# Patient Record
Sex: MALE | Race: WHITE | NOT HISPANIC OR LATINO | Employment: UNEMPLOYED | URBAN - METROPOLITAN AREA
[De-identification: names, ages, dates, MRNs, and addresses within clinical notes are randomized per-mention and may not be internally consistent; named-entity substitution may affect disease eponyms.]

---

## 2020-07-02 ENCOUNTER — HOSPITAL ENCOUNTER (EMERGENCY)
Facility: HOSPITAL | Age: 65
Discharge: HOME | End: 2020-07-03
Attending: EMERGENCY MEDICINE

## 2020-07-02 ENCOUNTER — NURSE TRIAGE (OUTPATIENT)
Dept: ADMINISTRATIVE | Facility: CLINIC | Age: 65
End: 2020-07-02

## 2020-07-02 DIAGNOSIS — N20.0 KIDNEY STONE: Primary | ICD-10-CM

## 2020-07-02 PROCEDURE — 99284 EMERGENCY DEPT VISIT MOD MDM: CPT | Mod: ,,, | Performed by: EMERGENCY MEDICINE

## 2020-07-02 PROCEDURE — 80047 BASIC METABLC PNL IONIZED CA: CPT

## 2020-07-02 PROCEDURE — 99284 EMERGENCY DEPT VISIT MOD MDM: CPT | Mod: 25

## 2020-07-02 PROCEDURE — 99284 PR EMERGENCY DEPT VISIT,LEVEL IV: ICD-10-PCS | Mod: ,,, | Performed by: EMERGENCY MEDICINE

## 2020-07-03 VITALS
DIASTOLIC BLOOD PRESSURE: 77 MMHG | TEMPERATURE: 98 F | HEART RATE: 74 BPM | SYSTOLIC BLOOD PRESSURE: 153 MMHG | OXYGEN SATURATION: 97 % | RESPIRATION RATE: 14 BRPM

## 2020-07-03 LAB
ALBUMIN SERPL BCP-MCNC: 4.1 G/DL (ref 3.5–5.2)
ALP SERPL-CCNC: 69 U/L (ref 55–135)
ALT SERPL W/O P-5'-P-CCNC: 14 U/L (ref 10–44)
ANION GAP SERPL CALC-SCNC: 10 MMOL/L (ref 8–16)
AST SERPL-CCNC: 19 U/L (ref 10–40)
BACTERIA #/AREA URNS AUTO: ABNORMAL /HPF
BASOPHILS # BLD AUTO: 0.03 K/UL (ref 0–0.2)
BASOPHILS NFR BLD: 0.2 % (ref 0–1.9)
BILIRUB SERPL-MCNC: 1 MG/DL (ref 0.1–1)
BILIRUB UR QL STRIP: NEGATIVE
BUN SERPL-MCNC: 16 MG/DL (ref 8–23)
BUN SERPL-MCNC: 18 MG/DL (ref 6–30)
CALCIUM SERPL-MCNC: 9.1 MG/DL (ref 8.7–10.5)
CHLORIDE SERPL-SCNC: 109 MMOL/L (ref 95–110)
CHLORIDE SERPL-SCNC: 110 MMOL/L (ref 95–110)
CLARITY UR REFRACT.AUTO: ABNORMAL
CO2 SERPL-SCNC: 24 MMOL/L (ref 23–29)
COLOR UR AUTO: YELLOW
CREAT SERPL-MCNC: 1.2 MG/DL (ref 0.5–1.4)
CREAT SERPL-MCNC: 1.2 MG/DL (ref 0.5–1.4)
DIFFERENTIAL METHOD: ABNORMAL
EOSINOPHIL # BLD AUTO: 0 K/UL (ref 0–0.5)
EOSINOPHIL NFR BLD: 0.1 % (ref 0–8)
ERYTHROCYTE [DISTWIDTH] IN BLOOD BY AUTOMATED COUNT: 13.3 % (ref 11.5–14.5)
EST. GFR  (AFRICAN AMERICAN): >60 ML/MIN/1.73 M^2
EST. GFR  (NON AFRICAN AMERICAN): >60 ML/MIN/1.73 M^2
GLUCOSE SERPL-MCNC: 115 MG/DL (ref 70–110)
GLUCOSE SERPL-MCNC: 116 MG/DL (ref 70–110)
GLUCOSE UR QL STRIP: NEGATIVE
HCT VFR BLD AUTO: 43.2 % (ref 40–54)
HCT VFR BLD CALC: 42 %PCV (ref 36–54)
HGB BLD-MCNC: 14.5 G/DL (ref 14–18)
HGB UR QL STRIP: ABNORMAL
HYALINE CASTS UR QL AUTO: 4 /LPF
IMM GRANULOCYTES # BLD AUTO: 0.04 K/UL (ref 0–0.04)
IMM GRANULOCYTES NFR BLD AUTO: 0.3 % (ref 0–0.5)
KETONES UR QL STRIP: ABNORMAL
LEUKOCYTE ESTERASE UR QL STRIP: NEGATIVE
LIPASE SERPL-CCNC: 7 U/L (ref 4–60)
LYMPHOCYTES # BLD AUTO: 1 K/UL (ref 1–4.8)
LYMPHOCYTES NFR BLD: 7.6 % (ref 18–48)
MCH RBC QN AUTO: 29.7 PG (ref 27–31)
MCHC RBC AUTO-ENTMCNC: 33.6 G/DL (ref 32–36)
MCV RBC AUTO: 88 FL (ref 82–98)
MICROSCOPIC COMMENT: ABNORMAL
MONOCYTES # BLD AUTO: 0.9 K/UL (ref 0.3–1)
MONOCYTES NFR BLD: 6.7 % (ref 4–15)
NEUTROPHILS # BLD AUTO: 11.4 K/UL (ref 1.8–7.7)
NEUTROPHILS NFR BLD: 85.1 % (ref 38–73)
NITRITE UR QL STRIP: NEGATIVE
NRBC BLD-RTO: 0 /100 WBC
PH UR STRIP: 7 [PH] (ref 5–8)
PLATELET # BLD AUTO: 229 K/UL (ref 150–350)
PMV BLD AUTO: 9.5 FL (ref 9.2–12.9)
POC IONIZED CALCIUM: 1.18 MMOL/L (ref 1.06–1.42)
POC TCO2 (MEASURED): 25 MMOL/L (ref 23–29)
POTASSIUM BLD-SCNC: 3.7 MMOL/L (ref 3.5–5.1)
POTASSIUM SERPL-SCNC: 3.7 MMOL/L (ref 3.5–5.1)
PROT SERPL-MCNC: 7.1 G/DL (ref 6–8.4)
PROT UR QL STRIP: ABNORMAL
RBC # BLD AUTO: 4.89 M/UL (ref 4.6–6.2)
RBC #/AREA URNS AUTO: >100 /HPF (ref 0–4)
SAMPLE: ABNORMAL
SODIUM BLD-SCNC: 144 MMOL/L (ref 136–145)
SODIUM SERPL-SCNC: 143 MMOL/L (ref 136–145)
SP GR UR STRIP: 1.02 (ref 1–1.03)
SQUAMOUS #/AREA URNS AUTO: 1 /HPF
URN SPEC COLLECT METH UR: ABNORMAL
WBC # BLD AUTO: 13.44 K/UL (ref 3.9–12.7)
WBC #/AREA URNS AUTO: 4 /HPF (ref 0–5)

## 2020-07-03 PROCEDURE — 83690 ASSAY OF LIPASE: CPT

## 2020-07-03 PROCEDURE — 85025 COMPLETE CBC W/AUTO DIFF WBC: CPT

## 2020-07-03 PROCEDURE — 25000003 PHARM REV CODE 250: Performed by: EMERGENCY MEDICINE

## 2020-07-03 PROCEDURE — 80053 COMPREHEN METABOLIC PANEL: CPT

## 2020-07-03 PROCEDURE — 96361 HYDRATE IV INFUSION ADD-ON: CPT

## 2020-07-03 PROCEDURE — 81001 URINALYSIS AUTO W/SCOPE: CPT

## 2020-07-03 PROCEDURE — 96374 THER/PROPH/DIAG INJ IV PUSH: CPT

## 2020-07-03 PROCEDURE — 96375 TX/PRO/DX INJ NEW DRUG ADDON: CPT

## 2020-07-03 PROCEDURE — 63600175 PHARM REV CODE 636 W HCPCS: Performed by: EMERGENCY MEDICINE

## 2020-07-03 RX ORDER — ONDANSETRON 2 MG/ML
4 INJECTION INTRAMUSCULAR; INTRAVENOUS
Status: COMPLETED | OUTPATIENT
Start: 2020-07-03 | End: 2020-07-03

## 2020-07-03 RX ORDER — KETOROLAC TROMETHAMINE 30 MG/ML
15 INJECTION, SOLUTION INTRAMUSCULAR; INTRAVENOUS
Status: COMPLETED | OUTPATIENT
Start: 2020-07-03 | End: 2020-07-03

## 2020-07-03 RX ORDER — MORPHINE SULFATE 4 MG/ML
8 INJECTION, SOLUTION INTRAMUSCULAR; INTRAVENOUS
Status: COMPLETED | OUTPATIENT
Start: 2020-07-03 | End: 2020-07-03

## 2020-07-03 RX ADMIN — ONDANSETRON 4 MG: 2 INJECTION INTRAMUSCULAR; INTRAVENOUS at 12:07

## 2020-07-03 RX ADMIN — SODIUM CHLORIDE 1000 ML: 0.9 INJECTION, SOLUTION INTRAVENOUS at 12:07

## 2020-07-03 RX ADMIN — MORPHINE SULFATE 8 MG: 4 INJECTION INTRAVENOUS at 12:07

## 2020-07-03 RX ADMIN — KETOROLAC TROMETHAMINE 15 MG: 30 INJECTION, SOLUTION INTRAMUSCULAR at 12:07

## 2020-07-03 NOTE — PROVIDER PROGRESS NOTES - EMERGENCY DEPT.
Signed out to me pending urinalysis and CT results.  UA shows elevated RBCs, no sign of infection.  CT shows 4 mm stone with mild hydronephrosis.  Vital stable.  Will discharge home.  Advised pt to follow up with PCP or return if concerning symptoms arise. Pt understands and agrees with plan. Will d/c home.

## 2020-07-03 NOTE — ED PROVIDER NOTES
Source of History:  Patient    Chief complaint:  Flank Pain (+hematuria)    HPI:  Nicolás Kaur is a 64 y.o. male with no chronic medical issues presenting to emergency department with complaint of flank and abdominal pain.  Patient states pain was sudden onset, sharp, and radiating from his right flank to his right lower abdomen and groin.  There is no testicular pain or swelling.  He noted dark urine but no gross hematuria.  He has not had dysuria.  No fevers or chills.  He had multiple episodes of vomiting.  On arrival, pain is 10/10.    He denies any trauma.  No previous history of kidney stones.  No family history of kidney stones.  He denies drug use.  He is not a smoker.    Last bowel movement was normal.  No diarrhea.  No blood in stool.    ROS: As per HPI and below:  Review of Systems   Constitutional: Negative for fever.   HENT: Negative for sore throat.    Eyes: Negative for double vision.   Respiratory: Negative for cough and shortness of breath.    Cardiovascular: Negative for chest pain.   Gastrointestinal: Negative for abdominal pain and vomiting.   Genitourinary: Negative for dysuria.   Musculoskeletal: Negative for falls.   Skin: Negative for rash.   Neurological: Negative for headaches.       Review of patient's allergies indicates:  No Known Allergies    No current facility-administered medications on file prior to encounter.      No current outpatient medications on file prior to encounter.       PMH:  As per HPI and below:  No past medical history on file.  No past surgical history on file.    Social History     Socioeconomic History    Marital status: Single     Spouse name: Not on file    Number of children: Not on file    Years of education: Not on file    Highest education level: Not on file   Occupational History    Not on file   Social Needs    Financial resource strain: Not on file    Food insecurity     Worry: Not on file     Inability: Not on file    Transportation  needs     Medical: Not on file     Non-medical: Not on file   Tobacco Use    Smoking status: Not on file   Substance and Sexual Activity    Alcohol use: Not on file    Drug use: Not on file    Sexual activity: Not on file   Lifestyle    Physical activity     Days per week: Not on file     Minutes per session: Not on file    Stress: Not on file   Relationships    Social connections     Talks on phone: Not on file     Gets together: Not on file     Attends Sikh service: Not on file     Active member of club or organization: Not on file     Attends meetings of clubs or organizations: Not on file     Relationship status: Not on file   Other Topics Concern    Not on file   Social History Narrative    Not on file       No family history on file.    Physical Exam:      Vitals:    07/03/20 0217   BP: (!) 153/77   Pulse: 74   Resp: 14   Temp: 98 °F (36.7 °C)     Gen:  Initially very uncomfortable, difficult to find position of comfort.  Later comfortable after receiving analgesics.  Mental Status:  Alert and oriented x 3.  Appropriate, conversant.  Skin: Warm, dry. No rashes seen.  Eyes: No conjunctival injection.  Pulm: No increased work of breathing.  No significant tachypnea.  No audible stridor or wheezing.  No conversational dyspnea.  Auscultation limited secondary to PPE.  CV: Regular rate. Regular rhythm. Normal peripheral perfusion. No lower extremity edema.  Abd: Soft.  Not distended.  Nontender.   No CVA tenderness.  :  Normal penis and testicles, no tenderness to palpation, no swelling  MSK: Good range of motion all joints.  No deformities.    Neuro: Awake. Speech normal. No focal neuro deficit observed.    Laboratory Studies:  Labs Reviewed   CBC W/ AUTO DIFFERENTIAL - Abnormal; Notable for the following components:       Result Value    WBC 13.44 (*)     Gran # (ANC) 11.4 (*)     Gran% 85.1 (*)     Lymph% 7.6 (*)     All other components within normal limits   COMPREHENSIVE METABOLIC PANEL -  Abnormal; Notable for the following components:    Glucose 115 (*)     All other components within normal limits   URINALYSIS, REFLEX TO URINE CULTURE - Abnormal; Notable for the following components:    Appearance, UA Cloudy (*)     Protein, UA 1+ (*)     Ketones, UA 1+ (*)     Occult Blood UA 3+ (*)     All other components within normal limits    Narrative:     Specimen Source->Urine   URINALYSIS MICROSCOPIC - Abnormal; Notable for the following components:    RBC, UA >100 (*)     Hyaline Casts, UA 4 (*)     All other components within normal limits    Narrative:     Specimen Source->Urine   ISTAT PROCEDURE - Abnormal; Notable for the following components:    POC Glucose 116 (*)     All other components within normal limits   LIPASE       Chart reviewed.     Imaging Results          CT Renal Stone Study ABD Pelvis WO (Final result)  Result time 07/03/20 01:52:50    Final result by Dilip Morris MD (07/03/20 01:52:50)                 Impression:      4 mm distal right ureteral stone with mild proximal right hydroureter and hydronephrosis.    4 mm nonobstructive renal stone in the left upper pole with no evidence of hydronephrosis on the left.    Exophytic cystic appearing structure originating from the left lower pole measuring 4.7 x 4.2 cm, likely representing mildly complex cyst.  Recommend non emergent ultrasound evaluation for further characterization if not previously performed.    Diverticulosis without evidence of diverticulitis.    Prostatomegaly.    Bilateral fat containing inguinal hernias versus spermatic cord lipomatosis.    Electronically signed by resident: Ron Patino  Date:    07/03/2020  Time:    00:51    Electronically signed by: Dilip Morris MD  Date:    07/03/2020  Time:    01:52             Narrative:    EXAMINATION:  CT RENAL STONE STUDY ABD PELVIS WO    CLINICAL HISTORY:  Flank pain, kidney stone suspected;    TECHNIQUE:  Low dose axial images, sagittal and coronal  reformations were obtained from the lung bases to the pubic symphysis.  Contrast was not administered.    COMPARISON:  None    FINDINGS:  Heart: Normal in size. No pericardial effusion.    Lung Bases: Well aerated, without consolidation or pleural fluid.    Liver: Normal in size and attenuation, with no focal hepatic lesions.    Gallbladder: No calcified gallstones.    Bile Ducts: No evidence of dilated ducts.    Pancreas: No mass or peripancreatic fat stranding.  Fatty replacement of the pancreas..    Spleen: Unremarkable.    Adrenals: Unremarkable.    Kidneys/ Ureters: Kidneys appear normal in size and location.  Four mm nonobstructive renal stone in the left upper pole.  4 mm right ureteral stone (axial series 2, image 85) with mild proximal dilation and mild right hydronephrosis.  Nonspecific perinephric fat stranding, right worse than left.  Exophytic cystic appearing structure originating from the left lower pole measuring 4.7 x 4.2 cm with attenuation slightly higher than expected for simple cyst.  Possibly representing mildly complex cyst..    Bladder: No evidence of wall thickening.    Reproductive organs: Prostatomegaly.  Bilateral fat containing inguinal hernias versus spermatic cord lipomatosis.    GI Tract/Mesentery: No evidence of bowel obstruction or inflammation.  Scattered colonic diverticuli without evidence of diverticulitis.    Peritoneal Space: No ascites. No free air.    Retroperitoneum: No significant adenopathy.    Abdominal wall: Unremarkable.    Vasculature: No significant atherosclerosis or aneurysm.    Bones: No acute fracture.  Degenerative changes of the spine and hips.                                Medications Given:  Medications   morphine injection 8 mg (8 mg Intravenous Given 7/3/20 0025)   ketorolac injection 15 mg (15 mg Intravenous Given 7/3/20 0024)   ondansetron injection 4 mg (4 mg Intravenous Given 7/3/20 0023)   sodium chloride 0.9% bolus 1,000 mL (0 mLs Intravenous Stopped  7/3/20 0137)     MDM:    64 y.o. male with acute onset severe right flank discomfort.  He is afebrile, stable, nontoxic.    Differential diagnosis including but not limited to:  Kidney stone, pyelonephritis, intra-abdominal infection, cystitis. Doubt testicular component as he has no testicular tenderness, testicular pain, or swelling.    Will plan CBC, CMP, lipase, urinalysis, and CT scan of abdomen.    Will give morphine, Toradol, fluids, Zofran, and re-evaluate.    Signed out to Dr. Monzon pending results of urinalysis and CT for final disposition.    Diagnostic Impression:    1. Kidney stone         ED Disposition Condition    Discharge Stable        ED Prescriptions     None        Follow-up Information     Follow up With Specialties Details Why Contact Info Additional Information    [facility] 4th Floor Urology   [address]  [facility] [id number] [phone] [facility] - 4th Floor                        Patient and/or family understands the plan and is in agreement, verbalized understanding, questions answered    Mary Kraus MD  Emergency Medicine       Mary Kraus MD  07/03/20 2570

## 2020-07-03 NOTE — ED NOTES
"Pt states, "6:30 took a shower and started having this pain right here and it went around to here. i'm a pretty healthy pedro. Don't drink or smoke or anything and I can handle pain but this hurts." pt reports right lower back pain that radiates around to RLQ of abdomen. No acute distress noted. Stable condition.   "

## 2020-07-03 NOTE — MEDICAL/APP STUDENT
"  History     Chief Complaint   Patient presents with    Flank Pain     +hematuria     64yoM presents with flank pain. Reports acute-onset R-sided flank pain while taking a shower this evening. Pain began at 6:30pm; described as constant, colicky, sharp pain in the right flank with radiation to the RLQ and right thigh (10/10 in severity). Denies alleviating or exacerbating factors. Pain associated with hematuria ("cola colored urine") and one episode of non-bloody, non-bilious emesis. Patient reports taking one-quarter of "pain pill, maybe percocet" without relief. Patient reports nausea but denies abdominal pain, chest pain, SOB, syncope, fever/chills and/or dysuria.           No past medical history on file.    No past surgical history on file.    No family history on file.    Social History     Tobacco Use    Smoking status: Not on file   Substance Use Topics    Alcohol use: Not on file    Drug use: Not on file       Review of Systems   Constitutional: Negative for chills and fever.   HENT: Negative for rhinorrhea and sore throat.    Respiratory: Negative for shortness of breath.    Cardiovascular: Negative for chest pain and palpitations.   Gastrointestinal: Positive for nausea and vomiting. Negative for abdominal pain.   Genitourinary: Positive for flank pain and hematuria. Negative for dysuria.   Musculoskeletal: Negative for arthralgias and myalgias.   Skin: Negative for wound.   Neurological: Negative for syncope and headaches.       Physical Exam   BP (!) 200/96   Pulse 76   Temp 99 °F (37.2 °C)   Resp 16   SpO2 100%     Physical Exam    Nursing note and vitals reviewed.  Constitutional: He appears well-developed and well-nourished.   HENT:   Head: Normocephalic and atraumatic.   Eyes: Conjunctivae are normal. Pupils are equal, round, and reactive to light.   Neck: Normal range of motion. Neck supple.   Cardiovascular: Normal rate, regular rhythm and normal heart sounds.   Pulmonary/Chest: Breath " sounds normal.   Abdominal: Soft. Bowel sounds are normal. He exhibits no distension. There is no abdominal tenderness. There is no rebound and no guarding.   Musculoskeletal: Normal range of motion. No tenderness or edema.   Neurological: He is alert and oriented to person, place, and time.   Skin: Skin is warm and dry.         ED Course

## 2020-07-03 NOTE — DISCHARGE INSTRUCTIONS
You were found to have a small cyst in your left kidney that will require an ultrasound in the future for further evaluation.  Please talk to your primary doctor about scheduling this non emergently.

## 2020-07-03 NOTE — TELEPHONE ENCOUNTER
Sister states pt felt fine and suddenly began have excruciating right flank pain that radiated around to his right groin, he states he urinated and it was a dark color and looked as though it had sediment in it, advised her to take him to ER, caller agreed      Reason for Disposition   [1] Sudden onset of severe flank pain AND [2] age > 60    Additional Information   Negative: Passed out (i.e., lost consciousness, collapsed and was not responding)   Negative: Shock suspected (e.g., cold/pale/clammy skin, too weak to stand, low BP, rapid pulse)   Negative: Difficult to awaken or acting confused (e.g., disoriented, slurred speech)   Negative: Sounds like a life-threatening emergency to the triager   Negative: Followed a major injury to the back (e.g., MVA, fall > 10 feet or 3 meters, penetrating injury, etc.)   Negative: Back pain or flank pain during pregnancy   Negative: Upper, mid or lower back pain that occurs mainly in the midline   Negative: [1] SEVERE pain (e.g., excruciating, scale 8-10) AND [2] present > 1 hour   Negative: [1] SEVERE pain (e.g., excruciating, scale 8-10) AND [2] not improved after pain medicine    Protocols used: FLANK PAIN-A-

## 2020-10-10 ENCOUNTER — HOSPITAL ENCOUNTER (EMERGENCY)
Facility: HOSPITAL | Age: 65
Discharge: HOME | End: 2020-10-10
Attending: EMERGENCY MEDICINE
Payer: MEDICAID

## 2020-10-10 VITALS
DIASTOLIC BLOOD PRESSURE: 82 MMHG | TEMPERATURE: 99 F | SYSTOLIC BLOOD PRESSURE: 139 MMHG | WEIGHT: 290 LBS | HEIGHT: 75 IN | OXYGEN SATURATION: 96 % | BODY MASS INDEX: 36.06 KG/M2 | RESPIRATION RATE: 18 BRPM | HEART RATE: 95 BPM

## 2020-10-10 DIAGNOSIS — L30.9 DERMATITIS: Primary | ICD-10-CM

## 2020-10-10 DIAGNOSIS — M25.562 ACUTE PAIN OF LEFT KNEE: ICD-10-CM

## 2020-10-10 LAB
ALBUMIN SERPL BCP-MCNC: 4.3 G/DL (ref 3.5–5.2)
ALP SERPL-CCNC: 73 U/L (ref 55–135)
ALT SERPL W/O P-5'-P-CCNC: 13 U/L (ref 10–44)
ANION GAP SERPL CALC-SCNC: 12 MMOL/L (ref 8–16)
AST SERPL-CCNC: 18 U/L (ref 10–40)
BASOPHILS # BLD AUTO: 0.05 K/UL (ref 0–0.2)
BASOPHILS NFR BLD: 0.5 % (ref 0–1.9)
BILIRUB SERPL-MCNC: 0.9 MG/DL (ref 0.1–1)
BUN SERPL-MCNC: 26 MG/DL (ref 8–23)
CALCIUM SERPL-MCNC: 9.9 MG/DL (ref 8.7–10.5)
CHLORIDE SERPL-SCNC: 107 MMOL/L (ref 95–110)
CO2 SERPL-SCNC: 23 MMOL/L (ref 23–29)
CREAT SERPL-MCNC: 1.1 MG/DL (ref 0.5–1.4)
DIFFERENTIAL METHOD: ABNORMAL
EOSINOPHIL # BLD AUTO: 0.4 K/UL (ref 0–0.5)
EOSINOPHIL NFR BLD: 3.7 % (ref 0–8)
ERYTHROCYTE [DISTWIDTH] IN BLOOD BY AUTOMATED COUNT: 13.2 % (ref 11.5–14.5)
EST. GFR  (AFRICAN AMERICAN): >60 ML/MIN/1.73 M^2
EST. GFR  (NON AFRICAN AMERICAN): >60 ML/MIN/1.73 M^2
GLUCOSE SERPL-MCNC: 100 MG/DL (ref 70–110)
HCT VFR BLD AUTO: 44.9 % (ref 40–54)
HGB BLD-MCNC: 15.1 G/DL (ref 14–18)
IMM GRANULOCYTES # BLD AUTO: 0.03 K/UL (ref 0–0.04)
IMM GRANULOCYTES NFR BLD AUTO: 0.3 % (ref 0–0.5)
LYMPHOCYTES # BLD AUTO: 1.3 K/UL (ref 1–4.8)
LYMPHOCYTES NFR BLD: 13.4 % (ref 18–48)
MCH RBC QN AUTO: 29 PG (ref 27–31)
MCHC RBC AUTO-ENTMCNC: 33.6 G/DL (ref 32–36)
MCV RBC AUTO: 86 FL (ref 82–98)
MONOCYTES # BLD AUTO: 0.8 K/UL (ref 0.3–1)
MONOCYTES NFR BLD: 8.1 % (ref 4–15)
NEUTROPHILS # BLD AUTO: 7.3 K/UL (ref 1.8–7.7)
NEUTROPHILS NFR BLD: 74 % (ref 38–73)
NRBC BLD-RTO: 0 /100 WBC
PLATELET # BLD AUTO: 273 K/UL (ref 150–350)
PMV BLD AUTO: 9.2 FL (ref 9.2–12.9)
POTASSIUM SERPL-SCNC: 4 MMOL/L (ref 3.5–5.1)
PROT SERPL-MCNC: 7.7 G/DL (ref 6–8.4)
RBC # BLD AUTO: 5.2 M/UL (ref 4.6–6.2)
SODIUM SERPL-SCNC: 142 MMOL/L (ref 136–145)
WBC # BLD AUTO: 9.79 K/UL (ref 3.9–12.7)

## 2020-10-10 PROCEDURE — S0028 INJECTION, FAMOTIDINE, 20 MG: HCPCS | Performed by: PHYSICIAN ASSISTANT

## 2020-10-10 PROCEDURE — 80053 COMPREHEN METABOLIC PANEL: CPT

## 2020-10-10 PROCEDURE — 96361 HYDRATE IV INFUSION ADD-ON: CPT

## 2020-10-10 PROCEDURE — 63600175 PHARM REV CODE 636 W HCPCS: Performed by: PHYSICIAN ASSISTANT

## 2020-10-10 PROCEDURE — 25000003 PHARM REV CODE 250: Performed by: PHYSICIAN ASSISTANT

## 2020-10-10 PROCEDURE — 99284 EMERGENCY DEPT VISIT MOD MDM: CPT | Mod: 25

## 2020-10-10 PROCEDURE — 99284 PR EMERGENCY DEPT VISIT,LEVEL IV: ICD-10-PCS | Mod: ,,, | Performed by: PHYSICIAN ASSISTANT

## 2020-10-10 PROCEDURE — 85025 COMPLETE CBC W/AUTO DIFF WBC: CPT

## 2020-10-10 PROCEDURE — 96374 THER/PROPH/DIAG INJ IV PUSH: CPT

## 2020-10-10 PROCEDURE — 99284 EMERGENCY DEPT VISIT MOD MDM: CPT | Mod: ,,, | Performed by: PHYSICIAN ASSISTANT

## 2020-10-10 RX ORDER — MELOXICAM 7.5 MG/1
7.5 TABLET ORAL DAILY PRN
Qty: 30 TABLET | Refills: 0 | Status: SHIPPED | OUTPATIENT
Start: 2020-10-10 | End: 2021-04-21 | Stop reason: CLARIF

## 2020-10-10 RX ORDER — PREDNISONE 20 MG/1
40 TABLET ORAL DAILY
Qty: 10 TABLET | Refills: 0 | Status: SHIPPED | OUTPATIENT
Start: 2020-10-11 | End: 2020-10-16

## 2020-10-10 RX ORDER — PREDNISONE 20 MG/1
40 TABLET ORAL DAILY
Qty: 10 TABLET | Refills: 0 | Status: SHIPPED | OUTPATIENT
Start: 2020-10-11 | End: 2020-10-10 | Stop reason: SDUPTHER

## 2020-10-10 RX ORDER — FAMOTIDINE 10 MG/ML
20 INJECTION INTRAVENOUS
Status: COMPLETED | OUTPATIENT
Start: 2020-10-10 | End: 2020-10-10

## 2020-10-10 RX ORDER — HYDROCORTISONE 25 MG/G
CREAM TOPICAL 2 TIMES DAILY
Qty: 1 TUBE | Refills: 0 | Status: SHIPPED | OUTPATIENT
Start: 2020-10-10 | End: 2021-10-21

## 2020-10-10 RX ORDER — MELOXICAM 7.5 MG/1
7.5 TABLET ORAL DAILY PRN
Qty: 30 TABLET | Refills: 0 | Status: SHIPPED | OUTPATIENT
Start: 2020-10-10 | End: 2020-10-10 | Stop reason: SDUPTHER

## 2020-10-10 RX ORDER — HYDROXYZINE HYDROCHLORIDE 25 MG/1
25 TABLET, FILM COATED ORAL 3 TIMES DAILY PRN
Qty: 30 TABLET | Refills: 0 | Status: SHIPPED | OUTPATIENT
Start: 2020-10-10 | End: 2020-10-10 | Stop reason: SDUPTHER

## 2020-10-10 RX ORDER — HYDROXYZINE HYDROCHLORIDE 25 MG/1
25 TABLET, FILM COATED ORAL 3 TIMES DAILY PRN
Qty: 30 TABLET | Refills: 0 | Status: SHIPPED | OUTPATIENT
Start: 2020-10-10 | End: 2021-10-21

## 2020-10-10 RX ORDER — TRIAMCINOLONE ACETONIDE 40 MG/ML
80 INJECTION, SUSPENSION INTRA-ARTICULAR; INTRAMUSCULAR
Status: COMPLETED | OUTPATIENT
Start: 2020-10-10 | End: 2020-10-10

## 2020-10-10 RX ADMIN — FAMOTIDINE 20 MG: 10 INJECTION INTRAVENOUS at 09:10

## 2020-10-10 RX ADMIN — SODIUM CHLORIDE 500 ML: 0.9 INJECTION, SOLUTION INTRAVENOUS at 09:10

## 2020-10-10 RX ADMIN — TRIAMCINOLONE ACETONIDE 80 MG: 40 INJECTION, SUSPENSION INTRA-ARTICULAR; INTRAMUSCULAR at 08:10

## 2020-10-11 NOTE — ED PROVIDER NOTES
Encounter Date: 10/10/2020       History     Chief Complaint   Patient presents with    Rash     rash to hands x 2 months, worse for the past 2 night     65 year old male with no significant past medical history presents for itching, erythematous, pruritic rash for about 2 months.  Rash started on his hands now he has a few lesions on his chest as well.  Rash started after washing his hands with hydrogen peroxide and using antibacterial soap.  He has tried multiple different remedies including hydrocortisone cream, antifungal cream and Neosporin without significant improvement.    He was also using nitrile gloves but has switched to latex. The rash is becoming extremely painful with skin cracking. He also states he has been feeling generally weak and fatigued although he cannot categorize this further.  He endorses some pain in his left knee  Improved with meloxicam but denies any other joint pain or swelling, fever/chills, chest pain, shortness of breath, abdominal pain, nausea/ vomiting / diarrhea or urinary symptoms.        Review of patient's allergies indicates:  No Known Allergies  History reviewed. No pertinent past medical history.  History reviewed. No pertinent surgical history.  History reviewed. No pertinent family history.  Social History     Tobacco Use    Smoking status: Never Smoker   Substance Use Topics    Alcohol use: Never     Frequency: Never    Drug use: Never     Review of Systems   Constitutional: Positive for diaphoresis and fatigue. Negative for fever.   HENT: Negative for sore throat.    Respiratory: Negative for shortness of breath.    Cardiovascular: Negative for chest pain.   Gastrointestinal: Negative for nausea.   Genitourinary: Negative for dysuria and hematuria.   Musculoskeletal: Positive for arthralgias. Negative for back pain and gait problem.   Skin: Positive for rash.   Neurological: Positive for weakness. Negative for dizziness, numbness and headaches.   Hematological:  Does not bruise/bleed easily.       Physical Exam     Initial Vitals [10/10/20 1909]   BP Pulse Resp Temp SpO2   139/82 95 18 98.7 °F (37.1 °C) 96 %      MAP       --         Physical Exam    Nursing note and vitals reviewed.  Constitutional: He appears well-developed and well-nourished. He is not diaphoretic. No distress.   HENT:   Head: Normocephalic and atraumatic.   Eyes: EOM are normal. Pupils are equal, round, and reactive to light.   Neck: Normal range of motion. Neck supple.   Cardiovascular: Normal rate, regular rhythm, normal heart sounds and intact distal pulses. Exam reveals no gallop and no friction rub.    No murmur heard.  Pulmonary/Chest: Breath sounds normal. No respiratory distress. He has no wheezes. He has no rales. He exhibits no tenderness.   Musculoskeletal: Normal range of motion.   Neurological: He is alert and oriented to person, place, and time.   Skin: Skin is warm and dry.   Erythematous patches on the dorsum of bilateral hands extending into the finger webs.  Skin is dry, cracked with a small amount of weeping. There are few maculopapular lesions on the chest   Psychiatric: He has a normal mood and affect.         ED Course   Procedures  Labs Reviewed   CBC W/ AUTO DIFFERENTIAL - Abnormal; Notable for the following components:       Result Value    Gran% 74.0 (*)     Lymph% 13.4 (*)     All other components within normal limits   COMPREHENSIVE METABOLIC PANEL - Abnormal; Notable for the following components:    BUN, Bld 26 (*)     All other components within normal limits          Imaging Results    None          Medical Decision Making:   History:   Old Medical Records: I decided to obtain old medical records.  Old Records Summarized: records from previous admission(s).       <> Summary of Records:  Patient was recently seen in this ED July for  nephrolithiasis  Initial Assessment:    65-year-old male presenting for a rash on his hands.  His vitals are normal, he appears very  uncomfortable but in no acute distress.  Differential Diagnosis:   Suspect contact dermatitis  Eczema   Psoriasis   I considered scabies given finger web involvement but appearance less consistent  I am unsure why the patient is feeling fatigued, some considerations could be anemia, renal dysfunction, dehydration  Clinical Tests:   Lab Tests: Ordered and Reviewed  ED Management:  Will check labs, give Kenalog and reassess.    Labs notable for mildly elevated BUN but otherwise unremarkable.  Patient requesting topical cream to go home with will persist hydrocortisone cream as well as short burst course of prednisone given severity of rash.  Will place ambulatory referral to Dermatology for follow-up. Stressed the importance of follow-up, strict ED return precautions given.  Patient voiced understanding and is comfortable with discharge.                      ED Course as of Oct 11 1428   Sat Oct 10, 2020   2102 CBC auto differential(!) [CC]   2102 Comprehensive metabolic panel(!) [CC]      ED Course User Index  [CC] Eli Norris PA-C            Clinical Impression:       ICD-10-CM ICD-9-CM   1. Dermatitis  L30.9 692.9   2. Acute pain of left knee  M25.562 719.46                      Disposition:   Disposition: Discharged  Condition: Stable     ED Disposition Condition    Discharge Stable        ED Prescriptions     Medication Sig Dispense Start Date End Date Auth. Provider    meloxicam (MOBIC) 7.5 MG tablet  (Status: Discontinued) Take 1 tablet (7.5 mg total) by mouth daily as needed for Pain. 30 tablet 10/10/2020 10/10/2020 Eli Norris PA-C    predniSONE (DELTASONE) 20 MG tablet  (Status: Discontinued) Take 2 tablets (40 mg total) by mouth once daily. for 5 days 10 tablet 10/11/2020 10/10/2020 Eli Norris PA-C    hydrOXYzine HCL (ATARAX) 25 MG tablet  (Status: Discontinued) Take 1 tablet (25 mg total) by mouth 3 (three) times daily as needed for Itching. 30 tablet 10/10/2020 10/10/2020 Eli  ANGELA Norris    hydrocortisone 2.5 % cream Apply topically 2 (two) times daily. 1 Tube 10/10/2020  Eli Norris PA-C    hydrOXYzine HCL (ATARAX) 25 MG tablet Take 1 tablet (25 mg total) by mouth 3 (three) times daily as needed for Itching. 30 tablet 10/10/2020  Eli Norris PA-C    meloxicam (MOBIC) 7.5 MG tablet Take 1 tablet (7.5 mg total) by mouth daily as needed for Pain. 30 tablet 10/10/2020  Eli Norris PA-C    predniSONE (DELTASONE) 20 MG tablet Take 2 tablets (40 mg total) by mouth once daily. for 5 days 10 tablet 10/11/2020 10/16/2020 Eli Norris PA-C        Follow-up Information     Follow up With Specialties Details Why Contact Info Additional Information    [facility] - Dermatology [address] [place] [address] Dermatology Schedule an appointment as soon as possible for a visit in 1 week  [address] [facility] [id number] [phone] Dermatology - Main Building, Clinic 11th Floor Please park in [facility]. Use Clinic elevators 12 & 13 to get to the 11th floor    [facility] Emergency Medicine Go to  If symptoms worsen [address] [id number] [phone]                                        Eli Norris PA-C  10/11/20 2886

## 2020-10-11 NOTE — DISCHARGE INSTRUCTIONS
Diagnosis:  Dermatitis, rash, knee pain    I suspect the rash on your hands is due to a dermatitis or irritation from hydrogen peroxide or other soaps.  You were given a steroid shot in the emergency department and I am prescribing a short course of steroids to help with the inflammation.  Make sure to keep the hands moisturized with a non scented lotion such as Eucerin or Vaseline.  It may be helpful to ladder your hands in lotion or Vaseline and wear socks on the hands overnight to help moisturize.  I am prescribing an anti-inflammatory medicine for your knee that you can take as needed.    I have placed a referral to Dermatology for follow-up.  Please schedule an appointment.  If you have any worsening symptoms or new symptoms such as high fever, pus or discharge from the hands, please return to the emergency department.

## 2020-10-11 NOTE — ED TRIAGE NOTES
Pt from home. Pt complains of rash to bilateral hands that began a few months ago. Hands are red with cracking skin. Pt states hands are itchy, burning, and painful. Pt states he thought it may have been from using hand  or the nitrate gloves he was using so he switched to latex. Pt has tried multiple creams, hydrocortisone, antibiotic cream, and a&d cream with no relief. Rash noted to face that he noticed this morning. Pt states he wakes up in the middle of the night in a sweat and itching all over the place. Pt states he just doesn't feel right. Rates pain 10/10. Pt does report a change in soap in the last few months.

## 2021-04-21 ENCOUNTER — HOSPITAL ENCOUNTER (EMERGENCY)
Facility: HOSPITAL | Age: 66
Discharge: HOME | End: 2021-04-21
Attending: EMERGENCY MEDICINE | Admitting: EMERGENCY MEDICINE
Payer: MEDICARE

## 2021-04-21 VITALS
DIASTOLIC BLOOD PRESSURE: 105 MMHG | HEART RATE: 78 BPM | BODY MASS INDEX: 37.3 KG/M2 | WEIGHT: 300 LBS | SYSTOLIC BLOOD PRESSURE: 182 MMHG | TEMPERATURE: 98 F | RESPIRATION RATE: 18 BRPM | OXYGEN SATURATION: 97 % | HEIGHT: 75 IN

## 2021-04-21 DIAGNOSIS — R03.0 ELEVATED BLOOD PRESSURE READING: Primary | ICD-10-CM

## 2021-04-21 PROCEDURE — 99282 PR EMERGENCY DEPT VISIT,LEVEL II: ICD-10-PCS | Mod: ,,, | Performed by: EMERGENCY MEDICINE

## 2021-04-21 PROCEDURE — 99282 EMERGENCY DEPT VISIT SF MDM: CPT | Mod: ,,, | Performed by: EMERGENCY MEDICINE

## 2021-04-21 PROCEDURE — 99281 EMR DPT VST MAYX REQ PHY/QHP: CPT

## 2021-08-29 ENCOUNTER — HOSPITAL ENCOUNTER (EMERGENCY)
Facility: HOSPITAL | Age: 66
Discharge: HOME | End: 2021-08-30
Attending: EMERGENCY MEDICINE
Payer: MEDICARE

## 2021-08-29 DIAGNOSIS — S01.01XA LACERATION OF SCALP, INITIAL ENCOUNTER: ICD-10-CM
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
## 2021-08-29 DIAGNOSIS — S09.90XA TRAUMATIC INJURY OF HEAD, INITIAL ENCOUNTER: Primary | ICD-10-CM

## 2021-08-29 LAB
BUN SERPL-MCNC: 22 MG/DL (ref 6–30)
CHLORIDE SERPL-SCNC: 107 MMOL/L (ref 95–110)
CREAT SERPL-MCNC: 1 MG/DL (ref 0.5–1.4)
GLUCOSE SERPL-MCNC: 108 MG/DL (ref 70–110)
HCT VFR BLD CALC: 41 %PCV (ref 36–54)
POC IONIZED CALCIUM: 1.18 MMOL/L (ref 1.06–1.42)
POC TCO2 (MEASURED): 20 MMOL/L (ref 23–29)
POTASSIUM BLD-SCNC: 3.4 MMOL/L (ref 3.5–5.1)
SAMPLE: ABNORMAL
SODIUM BLD-SCNC: 143 MMOL/L (ref 136–145)

## 2021-08-29 PROCEDURE — 80047 BASIC METABLC PNL IONIZED CA: CPT

## 2021-08-29 PROCEDURE — 96361 HYDRATE IV INFUSION ADD-ON: CPT

## 2021-08-29 PROCEDURE — 86803 HEPATITIS C AB TEST: CPT | Performed by: EMERGENCY MEDICINE

## 2021-08-29 PROCEDURE — 12032 INTMD RPR S/A/T/EXT 2.6-7.5: CPT

## 2021-08-29 PROCEDURE — 90715 TDAP VACCINE 7 YRS/> IM: CPT | Performed by: PHYSICIAN ASSISTANT

## 2021-08-29 PROCEDURE — 12002 PR RESUP NPTERF WND BODY 2.6-7.5 CM: ICD-10-PCS | Mod: ,,, | Performed by: EMERGENCY MEDICINE

## 2021-08-29 PROCEDURE — 25000003 PHARM REV CODE 250: Performed by: PHYSICIAN ASSISTANT

## 2021-08-29 PROCEDURE — 90471 IMMUNIZATION ADMIN: CPT | Performed by: PHYSICIAN ASSISTANT

## 2021-08-29 PROCEDURE — 96374 THER/PROPH/DIAG INJ IV PUSH: CPT | Mod: 59

## 2021-08-29 PROCEDURE — 63600175 PHARM REV CODE 636 W HCPCS: Performed by: PHYSICIAN ASSISTANT

## 2021-08-29 PROCEDURE — 99284 EMERGENCY DEPT VISIT MOD MDM: CPT | Mod: 25,,, | Performed by: EMERGENCY MEDICINE

## 2021-08-29 PROCEDURE — 99284 PR EMERGENCY DEPT VISIT,LEVEL IV: ICD-10-PCS | Mod: 25,,, | Performed by: EMERGENCY MEDICINE

## 2021-08-29 PROCEDURE — 12002 RPR S/N/AX/GEN/TRNK2.6-7.5CM: CPT

## 2021-08-29 PROCEDURE — 12002 RPR S/N/AX/GEN/TRNK2.6-7.5CM: CPT | Mod: ,,, | Performed by: EMERGENCY MEDICINE

## 2021-08-29 PROCEDURE — 99284 EMERGENCY DEPT VISIT MOD MDM: CPT | Mod: 25

## 2021-08-29 RX ORDER — BACITRACIN ZINC 500 [USP'U]/G
1 OINTMENT TOPICAL
Status: COMPLETED | OUTPATIENT
Start: 2021-08-29 | End: 2021-08-29

## 2021-08-29 RX ORDER — IBUPROFEN 600 MG/1
600 TABLET ORAL
Status: COMPLETED | OUTPATIENT
Start: 2021-08-30 | End: 2021-08-29

## 2021-08-29 RX ORDER — ACETAMINOPHEN AND CODEINE PHOSPHATE 300; 30 MG/1; MG/1
1 TABLET ORAL EVERY 6 HOURS PRN
Qty: 11 TABLET | Refills: 0 | Status: SHIPPED | OUTPATIENT
Start: 2021-08-29 | End: 2021-09-01

## 2021-08-29 RX ORDER — ONDANSETRON 4 MG/1
4 TABLET, ORALLY DISINTEGRATING ORAL EVERY 6 HOURS PRN
Qty: 15 TABLET | Refills: 0 | Status: SHIPPED | OUTPATIENT
Start: 2021-08-29 | End: 2021-08-29 | Stop reason: SDUPTHER

## 2021-08-29 RX ORDER — ACETAMINOPHEN AND CODEINE PHOSPHATE 300; 30 MG/1; MG/1
1 TABLET ORAL EVERY 6 HOURS PRN
Qty: 11 TABLET | Refills: 0 | Status: SHIPPED | OUTPATIENT
Start: 2021-08-29 | End: 2021-08-29 | Stop reason: SDUPTHER

## 2021-08-29 RX ORDER — ONDANSETRON 2 MG/ML
4 INJECTION INTRAMUSCULAR; INTRAVENOUS
Status: COMPLETED | OUTPATIENT
Start: 2021-08-29 | End: 2021-08-29

## 2021-08-29 RX ORDER — LIDOCAINE HYDROCHLORIDE AND EPINEPHRINE 10; 10 MG/ML; UG/ML
10 INJECTION, SOLUTION INFILTRATION; PERINEURAL
Status: COMPLETED | OUTPATIENT
Start: 2021-08-29 | End: 2021-08-29

## 2021-08-29 RX ORDER — ONDANSETRON 4 MG/1
4 TABLET, ORALLY DISINTEGRATING ORAL EVERY 6 HOURS PRN
Qty: 15 TABLET | Refills: 0 | Status: SHIPPED | OUTPATIENT
Start: 2021-08-29 | End: 2021-10-21

## 2021-08-29 RX ORDER — ACETAMINOPHEN AND CODEINE PHOSPHATE 300; 30 MG/1; MG/1
1 TABLET ORAL
Status: COMPLETED | OUTPATIENT
Start: 2021-08-29 | End: 2021-08-29

## 2021-08-29 RX ADMIN — ACETAMINOPHEN AND CODEINE PHOSPHATE 1 TABLET: 300; 30 TABLET ORAL at 10:08

## 2021-08-29 RX ADMIN — SODIUM CHLORIDE, SODIUM LACTATE, POTASSIUM CHLORIDE, AND CALCIUM CHLORIDE 1000 ML: .6; .31; .03; .02 INJECTION, SOLUTION INTRAVENOUS at 10:08

## 2021-08-29 RX ADMIN — IBUPROFEN 600 MG: 600 TABLET, FILM COATED ORAL at 11:08

## 2021-08-29 RX ADMIN — ONDANSETRON 4 MG: 2 INJECTION INTRAMUSCULAR; INTRAVENOUS at 08:08

## 2021-08-29 RX ADMIN — CLOSTRIDIUM TETANI TOXOID ANTIGEN (FORMALDEHYDE INACTIVATED), CORYNEBACTERIUM DIPHTHERIAE TOXOID ANTIGEN (FORMALDEHYDE INACTIVATED), BORDETELLA PERTUSSIS TOXOID ANTIGEN (GLUTARALDEHYDE INACTIVATED), BORDETELLA PERTUSSIS FILAMENTOUS HEMAGGLUTININ ANTIGEN (FORMALDEHYDE INACTIVATED), BORDETELLA PERTUSSIS PERTACTIN ANTIGEN, AND BORDETELLA PERTUSSIS FIMBRIAE 2/3 ANTIGEN 0.5 ML: 5; 2; 2.5; 5; 3; 5 INJECTION, SUSPENSION INTRAMUSCULAR at 10:08

## 2021-08-29 RX ADMIN — LIDOCAINE HYDROCHLORIDE AND EPINEPHRINE 10 ML: 10; 10 INJECTION, SOLUTION INFILTRATION; PERINEURAL at 08:08

## 2021-08-29 RX ADMIN — BACITRACIN 1 EACH: 500 OINTMENT TOPICAL at 10:08

## 2021-08-30 VITALS
TEMPERATURE: 99 F | RESPIRATION RATE: 16 BRPM | HEART RATE: 88 BPM | SYSTOLIC BLOOD PRESSURE: 175 MMHG | DIASTOLIC BLOOD PRESSURE: 92 MMHG | OXYGEN SATURATION: 98 %

## 2021-08-30 PROCEDURE — 25000003 PHARM REV CODE 250: Performed by: PHYSICIAN ASSISTANT

## 2021-08-31 LAB — HCV AB SERPL QL IA: NEGATIVE

## 2021-10-18 DIAGNOSIS — S09.90XD TRAUMATIC HEAD INJURY LESS THAN 3 MONTHS AGO, SUBSEQUENT ENCOUNTER: Primary | ICD-10-CM

## 2021-10-21 ENCOUNTER — TELEPHONE (OUTPATIENT)
Dept: INTERNAL MEDICINE | Facility: CLINIC | Age: 66
End: 2021-10-21

## 2021-10-21 ENCOUNTER — OFFICE VISIT (OUTPATIENT)
Dept: INTERNAL MEDICINE | Facility: CLINIC | Age: 66
End: 2021-10-21
Payer: MEDICARE

## 2021-10-21 VITALS
WEIGHT: 308.19 LBS | HEART RATE: 67 BPM | BODY MASS INDEX: 38.32 KG/M2 | OXYGEN SATURATION: 98 % | DIASTOLIC BLOOD PRESSURE: 88 MMHG | HEIGHT: 75 IN | SYSTOLIC BLOOD PRESSURE: 160 MMHG

## 2021-10-21 DIAGNOSIS — Z00.00 ANNUAL PHYSICAL EXAM: ICD-10-CM

## 2021-10-21 DIAGNOSIS — I10 ESSENTIAL (PRIMARY) HYPERTENSION: ICD-10-CM

## 2021-10-21 DIAGNOSIS — F07.81 POSTCONCUSSION SYNDROME: ICD-10-CM

## 2021-10-21 DIAGNOSIS — Z78.9 ADEQUATE NUTRITION: ICD-10-CM

## 2021-10-21 DIAGNOSIS — S09.90XS DIZZINESS DUE TO OLD HEAD TRAUMA: ICD-10-CM

## 2021-10-21 DIAGNOSIS — Z00.00 HEALTHCARE MAINTENANCE: Primary | ICD-10-CM

## 2021-10-21 DIAGNOSIS — R42 DIZZINESS DUE TO OLD HEAD TRAUMA: ICD-10-CM

## 2021-10-21 DIAGNOSIS — I10 HYPERTENSION, UNSPECIFIED TYPE: ICD-10-CM

## 2021-10-21 DIAGNOSIS — F32.A DEPRESSION, UNSPECIFIED DEPRESSION TYPE: ICD-10-CM

## 2021-10-21 DIAGNOSIS — Z87.442 HISTORY OF KIDNEY STONES: ICD-10-CM

## 2021-10-21 DIAGNOSIS — R79.9 ABNORMAL FINDING OF BLOOD CHEMISTRY, UNSPECIFIED: ICD-10-CM

## 2021-10-21 DIAGNOSIS — E66.9 OBESITY, UNSPECIFIED CLASSIFICATION, UNSPECIFIED OBESITY TYPE, UNSPECIFIED WHETHER SERIOUS COMORBIDITY PRESENT: ICD-10-CM

## 2021-10-21 PROCEDURE — 99213 OFFICE O/P EST LOW 20 MIN: CPT | Mod: PBBFAC | Performed by: STUDENT IN AN ORGANIZED HEALTH CARE EDUCATION/TRAINING PROGRAM

## 2021-10-21 PROCEDURE — 99999 PR PBB SHADOW E&M-EST. PATIENT-LVL III: CPT | Mod: PBBFAC,GC,, | Performed by: STUDENT IN AN ORGANIZED HEALTH CARE EDUCATION/TRAINING PROGRAM

## 2021-10-21 PROCEDURE — 99999 PR PBB SHADOW E&M-EST. PATIENT-LVL III: ICD-10-PCS | Mod: PBBFAC,GC,, | Performed by: STUDENT IN AN ORGANIZED HEALTH CARE EDUCATION/TRAINING PROGRAM

## 2021-10-21 PROCEDURE — 99215 PR OFFICE/OUTPT VISIT, EST, LEVL V, 40-54 MIN: ICD-10-PCS | Mod: S$PBB,GC,, | Performed by: STUDENT IN AN ORGANIZED HEALTH CARE EDUCATION/TRAINING PROGRAM

## 2021-10-21 PROCEDURE — 99215 OFFICE O/P EST HI 40 MIN: CPT | Mod: S$PBB,GC,, | Performed by: STUDENT IN AN ORGANIZED HEALTH CARE EDUCATION/TRAINING PROGRAM

## 2021-11-29 ENCOUNTER — TELEPHONE (OUTPATIENT)
Dept: NEUROLOGY | Facility: CLINIC | Age: 66
End: 2021-11-29
Payer: MEDICARE
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
## 2021-12-21 RX ORDER — AMOXICILLIN AND CLAVULANATE POTASSIUM 875; 125 MG/1; MG/1
1 TABLET, FILM COATED ORAL 2 TIMES DAILY
Qty: 28 TABLET | Refills: 0 | Status: SHIPPED | OUTPATIENT
Start: 2021-12-21 | End: 2022-01-04

## 2022-01-06 ENCOUNTER — NURSE TRIAGE (OUTPATIENT)
Dept: ADMINISTRATIVE | Facility: CLINIC | Age: 67
End: 2022-01-06
Payer: MEDICARE

## 2022-01-06 NOTE — TELEPHONE ENCOUNTER
Reason for Disposition   [1] SEVERE diarrhea (e.g., 7 or more times / day more than normal) AND [2] age > 60 years    Additional Information   Negative: Shock suspected (e.g., cold/pale/clammy skin, too weak to stand, low BP, rapid pulse)   Negative: Difficult to awaken or acting confused (e.g., disoriented, slurred speech)   Negative: Sounds like a life-threatening emergency to the triager   Negative: Vomiting also present and worse than the diarrhea   Negative: [1] Blood in stool AND [2] without diarrhea   Negative: Diarrhea in a cancer patient who is currently (or recently) receiving chemotherapy or radiation therapy, or cancer patient who has metastatic or end-stage cancer and is receiving palliative care   Negative: [1] SEVERE abdominal pain (e.g., excruciating) AND [2] present > 1 hour   Negative: [1] SEVERE abdominal pain AND [2] age > 60 years   Negative: [1] Blood in the stool AND [2] moderate or large amount of blood   Negative: Black or tarry bowel movements (Exception: chronic-unchanged black-grey bowel movements AND is taking iron pills or Pepto-bismol)   Negative: [1] Drinking very little AND [2] dehydration suspected (e.g., no urine > 12 hours, very dry mouth, very lightheaded)   Negative: Patient sounds very sick or weak to the triager    Protocols used: DIARRHEA-A-AH

## 2023-06-30 ENCOUNTER — PES CALL (OUTPATIENT)
Dept: ADMINISTRATIVE | Facility: CLINIC | Age: 68
End: 2023-06-30
Payer: MEDICARE

## 2023-08-02 ENCOUNTER — PES CALL (OUTPATIENT)
Dept: ADMINISTRATIVE | Facility: CLINIC | Age: 68
End: 2023-08-02
Payer: MEDICARE

## 2023-10-19 DIAGNOSIS — L23.9 ALLERGIC DERMATITIS: Primary | ICD-10-CM

## 2023-10-19 RX ORDER — TRIAMCINOLONE ACETONIDE 1 MG/G
CREAM TOPICAL 2 TIMES DAILY
Qty: 454 G | Refills: 1 | Status: SHIPPED | OUTPATIENT
Start: 2023-10-19

## 2023-10-19 RX ORDER — TRIAMCINOLONE ACETONIDE 1 MG/G
OINTMENT TOPICAL 2 TIMES DAILY
Qty: 453.6 G | Refills: 1 | Status: SHIPPED | OUTPATIENT
Start: 2023-10-19 | End: 2023-10-19 | Stop reason: CLARIF

## 2025-04-30 ENCOUNTER — HOSPITAL ENCOUNTER (EMERGENCY)
Facility: HOSPITAL | Age: 70
Discharge: HOME | End: 2025-05-01
Attending: EMERGENCY MEDICINE
Payer: MEDICARE

## 2025-04-30 DIAGNOSIS — R42 DIZZY: ICD-10-CM

## 2025-04-30 DIAGNOSIS — E04.1 THYROID NODULE: ICD-10-CM

## 2025-04-30 DIAGNOSIS — R11.2 NAUSEA AND VOMITING, UNSPECIFIED VOMITING TYPE: Primary | ICD-10-CM

## 2025-04-30 DIAGNOSIS — R03.0 ELEVATED BLOOD PRESSURE READING: ICD-10-CM

## 2025-04-30 PROCEDURE — 63600175 PHARM REV CODE 636 W HCPCS

## 2025-04-30 PROCEDURE — 86803 HEPATITIS C AB TEST: CPT | Performed by: PHYSICIAN ASSISTANT

## 2025-04-30 PROCEDURE — 96374 THER/PROPH/DIAG INJ IV PUSH: CPT

## 2025-04-30 PROCEDURE — 83880 ASSAY OF NATRIURETIC PEPTIDE: CPT

## 2025-04-30 PROCEDURE — 85025 COMPLETE CBC W/AUTO DIFF WBC: CPT

## 2025-04-30 PROCEDURE — 84484 ASSAY OF TROPONIN QUANT: CPT

## 2025-04-30 PROCEDURE — 80053 COMPREHEN METABOLIC PANEL: CPT

## 2025-04-30 PROCEDURE — 93010 ELECTROCARDIOGRAM REPORT: CPT | Mod: ,,, | Performed by: INTERNAL MEDICINE

## 2025-04-30 PROCEDURE — 87389 HIV-1 AG W/HIV-1&-2 AB AG IA: CPT | Performed by: PHYSICIAN ASSISTANT

## 2025-04-30 PROCEDURE — 99285 EMERGENCY DEPT VISIT HI MDM: CPT | Mod: 25

## 2025-04-30 PROCEDURE — 93005 ELECTROCARDIOGRAM TRACING: CPT

## 2025-04-30 RX ORDER — ONDANSETRON HYDROCHLORIDE 2 MG/ML
4 INJECTION, SOLUTION INTRAVENOUS
Status: COMPLETED | OUTPATIENT
Start: 2025-04-30 | End: 2025-04-30

## 2025-04-30 RX ADMIN — ONDANSETRON 4 MG: 2 INJECTION INTRAMUSCULAR; INTRAVENOUS at 11:04

## 2025-05-01 VITALS
BODY MASS INDEX: 35.43 KG/M2 | HEIGHT: 75 IN | WEIGHT: 285 LBS | RESPIRATION RATE: 17 BRPM | SYSTOLIC BLOOD PRESSURE: 160 MMHG | DIASTOLIC BLOOD PRESSURE: 84 MMHG | OXYGEN SATURATION: 95 % | TEMPERATURE: 98 F | HEART RATE: 71 BPM

## 2025-05-01 LAB
ABSOLUTE EOSINOPHIL (OHS): 0.08 K/UL
ABSOLUTE MONOCYTE (OHS): 0.67 K/UL (ref 0.3–1)
ABSOLUTE NEUTROPHIL COUNT (OHS): 7.65 K/UL (ref 1.8–7.7)
ALBUMIN SERPL BCP-MCNC: 3.5 G/DL (ref 3.5–5.2)
ALP SERPL-CCNC: 62 UNIT/L (ref 40–150)
ALT SERPL W/O P-5'-P-CCNC: 11 UNIT/L (ref 10–44)
ANION GAP (OHS): 11 MMOL/L (ref 8–16)
AST SERPL-CCNC: 18 UNIT/L (ref 11–45)
BASOPHILS # BLD AUTO: 0.03 K/UL
BASOPHILS NFR BLD AUTO: 0.3 %
BILIRUB SERPL-MCNC: 1 MG/DL (ref 0.1–1)
BNP SERPL-MCNC: 50 PG/ML (ref 0–99)
BUN SERPL-MCNC: 9 MG/DL (ref 8–23)
CALCIUM SERPL-MCNC: 8.8 MG/DL (ref 8.7–10.5)
CHLORIDE SERPL-SCNC: 109 MMOL/L (ref 95–110)
CO2 SERPL-SCNC: 22 MMOL/L (ref 23–29)
CREAT SERPL-MCNC: 0.9 MG/DL (ref 0.5–1.4)
ERYTHROCYTE [DISTWIDTH] IN BLOOD BY AUTOMATED COUNT: 13.2 % (ref 11.5–14.5)
GFR SERPLBLD CREATININE-BSD FMLA CKD-EPI: >60 ML/MIN/1.73/M2
GLUCOSE SERPL-MCNC: 117 MG/DL (ref 70–110)
HCT VFR BLD AUTO: 42 % (ref 40–54)
HCV AB SERPL QL IA: NORMAL
HGB BLD-MCNC: 14.3 GM/DL (ref 14–18)
HIV 1+2 AB+HIV1 P24 AG SERPL QL IA: NORMAL
IMM GRANULOCYTES # BLD AUTO: 0.03 K/UL (ref 0–0.04)
IMM GRANULOCYTES NFR BLD AUTO: 0.3 % (ref 0–0.5)
LIPASE SERPL-CCNC: 13 U/L (ref 4–60)
LYMPHOCYTES # BLD AUTO: 0.9 K/UL (ref 1–4.8)
MCH RBC QN AUTO: 29.2 PG (ref 27–31)
MCHC RBC AUTO-ENTMCNC: 34 G/DL (ref 32–36)
MCV RBC AUTO: 86 FL (ref 82–98)
NUCLEATED RBC (/100WBC) (OHS): 0 /100 WBC
OHS QRS DURATION: 94 MS
OHS QTC CALCULATION: 420 MS
PLATELET # BLD AUTO: 218 K/UL (ref 150–450)
PMV BLD AUTO: 9.2 FL (ref 9.2–12.9)
POTASSIUM SERPL-SCNC: 4.3 MMOL/L (ref 3.5–5.1)
PROT SERPL-MCNC: 6.8 GM/DL (ref 6–8.4)
RBC # BLD AUTO: 4.9 M/UL (ref 4.6–6.2)
RELATIVE EOSINOPHIL (OHS): 0.9 %
RELATIVE LYMPHOCYTE (OHS): 9.6 % (ref 18–48)
RELATIVE MONOCYTE (OHS): 7.2 % (ref 4–15)
RELATIVE NEUTROPHIL (OHS): 81.7 % (ref 38–73)
SODIUM SERPL-SCNC: 142 MMOL/L (ref 136–145)
TROPONIN I SERPL HS-MCNC: 4 NG/L
TROPONIN I SERPL HS-MCNC: <3 NG/L
WBC # BLD AUTO: 9.36 K/UL (ref 3.9–12.7)

## 2025-05-01 PROCEDURE — 63600175 PHARM REV CODE 636 W HCPCS: Performed by: EMERGENCY MEDICINE

## 2025-05-01 PROCEDURE — 25000003 PHARM REV CODE 250

## 2025-05-01 PROCEDURE — 96375 TX/PRO/DX INJ NEW DRUG ADDON: CPT | Mod: 59

## 2025-05-01 PROCEDURE — 25000003 PHARM REV CODE 250: Performed by: EMERGENCY MEDICINE

## 2025-05-01 PROCEDURE — 84484 ASSAY OF TROPONIN QUANT: CPT

## 2025-05-01 PROCEDURE — 80047 BASIC METABLC PNL IONIZED CA: CPT

## 2025-05-01 PROCEDURE — 83690 ASSAY OF LIPASE: CPT | Performed by: EMERGENCY MEDICINE

## 2025-05-01 PROCEDURE — 25500020 PHARM REV CODE 255: Performed by: EMERGENCY MEDICINE

## 2025-05-01 PROCEDURE — 96376 TX/PRO/DX INJ SAME DRUG ADON: CPT

## 2025-05-01 RX ORDER — MECLIZINE HYDROCHLORIDE 25 MG/1
25 TABLET ORAL 3 TIMES DAILY PRN
Qty: 40 TABLET | Refills: 0 | Status: SHIPPED | OUTPATIENT
Start: 2025-05-01

## 2025-05-01 RX ORDER — ONDANSETRON HYDROCHLORIDE 2 MG/ML
4 INJECTION, SOLUTION INTRAVENOUS ONCE AS NEEDED
Status: COMPLETED | OUTPATIENT
Start: 2025-05-01 | End: 2025-05-01

## 2025-05-01 RX ORDER — DIAZEPAM 2 MG/1
2 TABLET ORAL
Status: COMPLETED | OUTPATIENT
Start: 2025-05-01 | End: 2025-05-01

## 2025-05-01 RX ORDER — MECLIZINE HCL 12.5 MG 12.5 MG/1
50 TABLET ORAL
Status: COMPLETED | OUTPATIENT
Start: 2025-05-01 | End: 2025-05-01

## 2025-05-01 RX ORDER — DROPERIDOL 2.5 MG/ML
0.62 INJECTION, SOLUTION INTRAMUSCULAR; INTRAVENOUS
Status: COMPLETED | OUTPATIENT
Start: 2025-05-01 | End: 2025-05-01

## 2025-05-01 RX ORDER — ONDANSETRON 4 MG/1
4 TABLET, ORALLY DISINTEGRATING ORAL EVERY 6 HOURS PRN
Qty: 12 TABLET | Refills: 0 | Status: SHIPPED | OUTPATIENT
Start: 2025-05-01 | End: 2025-05-04

## 2025-05-01 RX ORDER — TRIAMCINOLONE ACETONIDE 1 MG/G
CREAM TOPICAL 2 TIMES DAILY
Qty: 80 G | Refills: 0 | Status: SHIPPED | OUTPATIENT
Start: 2025-05-01

## 2025-05-01 RX ORDER — TRIAMCINOLONE ACETONIDE 1 MG/G
CREAM TOPICAL 2 TIMES DAILY
Qty: 80 G | Refills: 0 | Status: SHIPPED | OUTPATIENT
Start: 2025-05-01 | End: 2025-05-01

## 2025-05-01 RX ADMIN — IOHEXOL 75 ML: 350 INJECTION, SOLUTION INTRAVENOUS at 01:05

## 2025-05-01 RX ADMIN — MECLIZINE HYDROCHLORIDE 50 MG: 12.5 TABLET ORAL at 12:05

## 2025-05-01 RX ADMIN — ONDANSETRON 4 MG: 2 INJECTION INTRAMUSCULAR; INTRAVENOUS at 01:05

## 2025-05-01 RX ADMIN — DIAZEPAM 2 MG: 2 TABLET ORAL at 04:05

## 2025-05-01 RX ADMIN — DROPERIDOL 0.62 MG: 2.5 INJECTION, SOLUTION INTRAMUSCULAR; INTRAVENOUS at 02:05

## 2025-05-01 NOTE — ED NOTES
I-STAT Chem-8+ Results:   Value Reference Range   Sodium 140 136-145 mmol/L   Potassium  4.1 3.5-5.1 mmol/L   Chloride 107  mmol/L   Ionized Calcium 1.03 1.06-1.42 mmol/L   CO2 (measured) 25 23-29 mmol/L   Glucose 122  mg/dL   BUN 10 6-30 mg/dL   Creatinine 0.8 0.5-1.4 mg/dL   Hematocrit 39 36-54%

## 2025-05-01 NOTE — ED PROVIDER NOTES
Encounter Date: 4/30/2025       History     Chief Complaint   Patient presents with    Emesis     Vomiting since 2pm today, pt dizzy and diaphoretic     69-year-old male with no significant past medical history presenting for evaluation of nausea/vomiting and diaphoresis.  Patient reports symptoms began approximately 3 hours prior to arrival.  Patient noted a sensation of dizziness as if he was unsteady on his feet and nausea/vomiting.  Patient also was diaphoretic at this time.  Patient denies abdominal pain, chest pain, shortness of breath, fevers/chills, leg swelling.    The history is provided by the patient. No  was used.     Review of patient's allergies indicates:  No Known Allergies  Past Medical History:   Diagnosis Date    Hay fever      History reviewed. No pertinent surgical history.  Family History   Problem Relation Name Age of Onset    No Known Problems Mother      No Known Problems Father      No Known Problems Sister       Social History[1]      Physical Exam     Initial Vitals [04/30/25 2313]   BP Pulse Resp Temp SpO2   (!) 180/86 62 18 98.2 °F (36.8 °C) 98 %      MAP       --         Physical Exam    Nursing note and vitals reviewed.  Constitutional: He is Obese .   HENT:   Head: Normocephalic and atraumatic.   Eyes: Conjunctivae and EOM are normal. Pupils are equal, round, and reactive to light.   Cataract in right eye   Cardiovascular:  Normal rate, regular rhythm and normal heart sounds.           Pulmonary/Chest: Breath sounds normal. No respiratory distress. He has no wheezes. He has no rhonchi. He has no rales.   Abdominal: Abdomen is soft. There is no abdominal tenderness.   Musculoskeletal:      Comments: No lower extremity edema     Neurological: He is alert and oriented to person, place, and time. He has normal strength. No cranial nerve deficit or sensory deficit. Coordination normal.         ED Course   Procedures  Labs Reviewed   COMPREHENSIVE METABOLIC PANEL -  Abnormal       Result Value    Sodium 142      Potassium 4.3      Chloride 109      CO2 22 (*)     Glucose 117 (*)     BUN 9      Creatinine 0.9      Calcium 8.8      Protein Total 6.8      Albumin 3.5      Bilirubin Total 1.0      ALP 62      AST 18      ALT 11      Anion Gap 11      eGFR >60     CBC WITH DIFFERENTIAL - Abnormal    WBC 9.36      RBC 4.90      HGB 14.3      HCT 42.0      MCV 86      MCH 29.2      MCHC 34.0      RDW 13.2      Platelet Count 218      MPV 9.2      Nucleated RBC 0      Neut % 81.7 (*)     Lymph % 9.6 (*)     Mono % 7.2      Eos % 0.9      Basophil % 0.3      Imm Grans % 0.3      Neut # 7.65      Lymph # 0.90 (*)     Mono # 0.67      Eos # 0.08      Baso # 0.03      Imm Grans # 0.03     HEPATITIS C ANTIBODY - Normal    Hep C Ab Interp Non-Reactive     HIV 1 / 2 ANTIBODY - Normal    HIV 1/2 Ag/Ab Non-Reactive     TROPONIN I HIGH SENSITIVITY - Normal    Troponin High Sensitive <3     B-TYPE NATRIURETIC PEPTIDE - Normal    BNP 50     LIPASE - Normal    Lipase Level 13     TROPONIN I HIGH SENSITIVITY - Normal    Troponin High Sensitive 4     CBC W/ AUTO DIFFERENTIAL    Narrative:     The following orders were created for panel order CBC auto differential.  Procedure                               Abnormality         Status                     ---------                               -----------         ------                     CBC with Differential[[id number]]       Abnormal            Final result                 Please view results for these tests on the individual orders.   HEP C VIRUS HOLD SPECIMEN   ISTAT CHEM8     EKG Readings: (Independently Interpreted)   No previous EKG available for comparison.  Normal sinus rhythm, 64 beats per minute, LAFB, no STEMI, HI/QRS/QTC within normal limits       Imaging Results              MRI Brain Without Contrast (Final result)  Result time 05/01/25 03:43:41      Final result by Justin Silva MD (05/01/25 03:43:41)                   Impression:       1. No evidence of acute brain infarct.  2. Chronic microvascular ischemic change.    Electronically signed by resident: Da Anaya  Date:    05/01/2025  Time:    02:48    Electronically signed by: Justin Silva  Date:    05/01/2025  Time:    03:43               Narrative:    EXAMINATION:  MRI BRAIN WITHOUT CONTRAST    CLINICAL HISTORY:  Dizziness, persistent/recurrent, cardiac or vascular cause suspected.    TECHNIQUE:  Multiplanar multisequence MR imaging of the brain was performed without contrast.    COMPARISON:  Same day CTA head neck, CT head 08/29/2021    FINDINGS:  Intracranial compartment:    Ventricles and sulci are normal in size for age without evidence of hydrocephalus. No extra-axial blood or fluid collections.    Scattered T2 FLAIR hyperintensities in the subcortical and periventricular white matter, overall nonspecific but can be seen in setting of chronic microvascular ischemic disease. No mass lesion, acute hemorrhage, edema or acute infarct.  Subcentimeter focus of susceptibility in the left cerebral peduncle, possibly related to an old hemorrhage.    Normal vascular flow voids are preserved.    Skull/extracranial contents (limited evaluation): Bone marrow signal intensity is normal.  Small maxillary mucous retention cysts.                        Preliminary result by Da Anaya MD (05/01/25 02:51:42)                   Impression:      1. No evidence of acute brain infarct.  2. Chronic microvascular ischemic change.    Electronically signed by resident: Da Anaya  Date:    05/01/2025  Time:    02:48                 Narrative:    EXAMINATION:  MRI BRAIN WITHOUT CONTRAST    CLINICAL HISTORY:  Dizziness, persistent/recurrent, cardiac or vascular cause suspected;.    TECHNIQUE:  Multiplanar multisequence MR imaging of the brain was performed without contrast.    COMPARISON:  Same day CTA head neck, CT head 08/29/2021    FINDINGS:  Intracranial compartment:    Ventricles and sulci are  normal in size for age without evidence of hydrocephalus. No extra-axial blood or fluid collections.    Scattered T2 FLAIR hyperintensities in the subcortical and periventricular white matter, overall nonspecific but can be seen in setting of chronic microvascular ischemic disease. No mass lesion, acute hemorrhage, edema or acute infarct.    Normal vascular flow voids are preserved.    Skull/extracranial contents (limited evaluation): Bone marrow signal intensity is normal.  Small maxillary mucous retention cysts.                                       CTA Head and Neck (xpd) (Final result)  Result time 05/01/25 02:25:46      Final result by Justin Silva MD (05/01/25 02:25:46)                   Impression:      1. No acute intracranial abnormality, specifically no intracranial hemorrhage or major vascular territory infarct.  2. No large vessel occlusion or hemodynamically significant large vessel stenosis in the craniocervical cerebrovascular arterial circulation.  3. Chronic microvascular ischemic change.  4. Additional findings as above.    Electronically signed by resident: Da Anaya  Date:    05/01/2025  Time:    01:52    Electronically signed by: Justin Silva  Date:    05/01/2025  Time:    02:25               Narrative:    EXAMINATION:  CTA HEAD AND NECK (XPD)    CLINICAL HISTORY:  Dizziness, persistent/recurrent, cardiac or vascular cause suspected    TECHNIQUE:  Axial CT images obtained throughout the region of the head before and after the administration of intravenous contrast.  CT angiogram was performed through the cervical and intracranial vasculature during the IV bolus administration of 75mL of Omnipaque 350.  Multiplanar MPR and MIP reformats were performed.    COMPARISON:  CT head 08/29/2021    FINDINGS:  The ventricles are normal in size without evidence of hydrocephalus.    Patchy and confluent hypoattenuation throughout the supratentorial white matter, nonspecific but may be seen in the  setting of chronic microvascular ischemic change..  No parenchymal mass, hemorrhage, edema or major vascular distribution infarct.    No extra-axial blood or fluid collections.    No displaced calvarial fracture.  Mastoid air cells are clear.  Few small maxillary mucous retention cysts.  Patchy mucosal thickening of the ethmoid air cells.    CTA:    Left-sided, two-vessel aortic arch with common origin of the brachiocephalic trunk and left common carotid artery. The aortic arch demonstrates no significant stenosis at the major branch vessel origins.    The right common carotid artery is normal in caliber. Mild atherosclerosis at the bifurcation with less than 50% stenosis per NASCET criteria.The right internal carotid artery is normal in caliber noting calcification of the cavernous segment without significant stenosis.    The left common carotid artery is normal in caliber. Mild atherosclerosis at the bifurcation with less than 50% stenosis per NASCET criteria.The left internal carotid artery is normal in caliber noting calcification of the cavernous segment without significant stenosis.    Codominant vertebral arteries.  The right vertebral artery is normal in caliber.The left vertebral artery is normal in caliber.    Basilar artery is within normal limits without focal abnormality.    The proximal anterior, middle, and posterior cerebral arteries are within normal limits.  No evidence of significant stenosis, focal occlusion, or intracranial aneurysm.    Subcentimeter hypodense left thyroid nodule which requires no further follow-up per ACR recommendations.  Cervical soft tissues are otherwise within normal limits.  Lung apices are clear.  Mild multilevel cervical spondylosis.  Right C2-C3 facet ankylosis.  No acute fracture or aggressive osseous lesion.                        Preliminary result by Da Anaya MD (05/01/25 02:06:35)                   Impression:      1. No acute intracranial abnormality,  specifically no intracranial hemorrhage or major vascular territory infarct.  2. No large vessel occlusion or hemodynamically significant stenosis about the intracranial or extracranial vasculature.  3. Chronic microvascular ischemic change.  4. Additional findings as above.    Electronically signed by resident: Da Anaya  Date:    05/01/2025  Time:    01:52                 Narrative:    EXAMINATION:  CTA HEAD AND NECK (XPD)    CLINICAL HISTORY:  Dizziness, persistent/recurrent, cardiac or vascular cause suspected;    TECHNIQUE:  Axial CT images obtained throughout the region of the head before and after the administration of intravenous contrast.  CT angiogram was performed through the cervical and intracranial vasculature during the IV bolus administration of 75mL of Omnipaque 350.  Multiplanar MPR and MIP reformats were performed.    COMPARISON:  CT head 08/29/2021    FINDINGS:  The ventricles are normal in size without evidence of hydrocephalus.    Patchy and confluent hypoattenuation throughout the supratentorial white matter, nonspecific but may be seen in the setting of chronic microvascular ischemic change..  No parenchymal mass, hemorrhage, edema or major vascular distribution infarct.    No extra-axial blood or fluid collections.    No displaced calvarial fracture.  Mastoid air cells are clear.  Few small maxillary mucous retention cysts.  Patchy mucosal thickening of the ethmoid air cells.    CTA:    Left-sided, two-vessel aortic arch with common origin of the brachiocephalic trunk and left common carotid artery. The aortic arch demonstrates no significant stenosis at the major branch vessel origins.    The right common carotid artery is normal in caliber. Mild atherosclerosis at the bifurcation with less than 50% stenosis per NASCET criteria.The right internal carotid artery is normal in caliber noting calcification of the cavernous segment without significant stenosis.    The left common carotid  artery is normal in caliber. Mild atherosclerosis at the bifurcation with less than 50% stenosis per NASCET criteria.The left internal carotid artery is normal in caliber noting calcification of the cavernous segment without significant stenosis.    Codominant vertebral arteries.  The right vertebral artery is normal in caliber.The left vertebral artery is normal in caliber.    Basilar artery is within normal limits without focal abnormality.    The proximal anterior, middle, and posterior cerebral arteries are within normal limits.  No evidence of significant stenosis, focal occlusion, or intracranial aneurysm.    Subcentimeter hypodense left thyroid nodule which requires no further follow-up per ACR recommendations.  Cervical soft tissues are otherwise within normal limits.  Lung apices are clear.  Mild multilevel cervical spondylosis.  Right C2-C3 facet ankylosis.  No acute fracture or aggressive osseous lesion.                                       X-Ray Chest AP Portable (Final result)  Result time 05/01/25 01:11:35      Final result by Bianca Lucas MD (05/01/25 01:11:35)                   Impression:      Please see above.      Electronically signed by: Bianca Lucas MD  Date:    05/01/2025  Time:    01:11               Narrative:    EXAMINATION:  XR CHEST AP PORTABLE    CLINICAL HISTORY:  Chest Pain;    TECHNIQUE:  Single frontal view of the chest was performed.    COMPARISON:  None    FINDINGS:  Cardiac monitoring leads overlie the chest.  Mediastinal structures are midline.  There is slight prominence of the cardiomediastinal silhouette a component of which could relate to accentuation by portable AP technique and patient body habitus.  The lungs are symmetrically expanded with diffuse coarse interstitial attenuation.  No confluent airspace consolidation, substantial volume of pleural fluid or pneumothorax identified.  Visualized osseous structures appear intact.                                        Medications   ondansetron injection 4 mg (4 mg Intravenous Given 4/30/25 4132)   ondansetron injection 4 mg (4 mg Intravenous Given 5/1/25 0153)   meclizine tablet 50 mg (50 mg Oral Given 5/1/25 0026)   iohexoL (OMNIPAQUE 350) injection 75 mL (75 mLs Intravenous Given 5/1/25 0129)   droPERidol injection 0.625 mg (0.625 mg Intravenous Given 5/1/25 0256)   diazePAM tablet 2 mg (2 mg Oral Given 5/1/25 4046)     Medical Decision Making  69-year-old male with no significant past medical history presenting for evaluation of nausea/vomiting and diaphoresis.    Differential diagnosis includes, but is not limited to, ACS, vertigo, posterior stroke.    In emergency department, patient hemodynamically stable.  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  CTA head and neck without acute findings, MRI brain negative.  Initial and repeat troponin within normal limits, BNP within normal limits, no signs of ischemia or dysrhythmia on EKG.  On initial re-evaluation, patient reported improvement in symptoms without complete resolution after receiving Zofran, meclizine, droperidol.  Patient given 2 mg diazepam, after which patient ambulatory without difficulty and now reporting resolution of her symptoms.  Patient is stable for discharge.  Return precautions given.  Patient verbalized understanding on room with plan.  Patient given prescriptions for meclizine and Zofran for his nausea and vertigo and triamcinolone cream for history of eczema.    Amount and/or Complexity of Data Reviewed  Labs: ordered. Decision-making details documented in ED Course.  Radiology: ordered and independent interpretation performed. Decision-making details documented in ED Course.  ECG/medicine tests: ordered and independent interpretation performed. Decision-making details documented in ED Course.    Risk  Prescription drug management.  Risk Details: Patient received  Zofran, meclizine, droperidol, diazepam while in the emergency department.  Patient discharged with prescriptions for triamcinolone cream, Zofran, meclizine.              Attending Attestation:   Physician Attestation Statement for Resident:  As the supervising MD   Physician Attestation Statement: I have personally seen and examined this patient.   I agree with the above history.  -:   As the supervising MD I agree with the above PE.     As the supervising MD I agree with the above treatment, course, plan, and disposition.   -: 69-year-old male presenting to emergency department with complaint of dizziness, nausea and vomiting.  Denies headache, chest pain, difficulty breathing.  Denies trauma.  Afebrile, hemodynamically stable, neuro intact.  No abdominal tenderness on exam.  EKG, labs, and CTA of the head and neck were obtained.  EKG without acute ischemic change.  Labs including troponin and lipase were unremarkable.  CTA of the head and neck without large vessel occlusion or other acute abnormality.  He had persistent symptoms, received additional dose of antiemetics and meclizine.  We did obtain an MRI of the brain which was negative for acute stroke.  Patient had persistent dizziness, was given dose of Valium.  He was offered observation stay but stated that he felt better and was comfortable with discharge home.  Prescriptions provided.   I have reviewed and agree with the residents interpretation of the following: lab data, x-rays, CT scans and EKG.  I have reviewed the following: old records at this facility.                ED Course as of 05/01/25 0437   Thu May 01, 2025   0013 CBC auto differential(!)  No anemia or leukocytosis [BH]   0055 Troponin I High Sensitivity #1  Within normal limits, lowering concern for ACS [BH]   0055 B-Type natriuretic peptide (BNP)  Within normal limits, lowering concern for CHF [BH]   0055 Comprehensive metabolic panel(!)  No concerning electrolyte abnormalities [BH]   0055  "X-Ray Chest AP Portable  Independent interpretation of the chest x-ray: No large effusion, consolidation, or pneumothorax []   0222 Lipase  Not concerning for pancreatitis []   0406 On re-evaluation, patient reports he is feeling "a little better" and states that both his nausea and his dizziness have improved. []      ED Course User Index  [] Kulwinder Woodruff MD                           Clinical Impression:  Final diagnoses:  [R42] Dizzy  [R11.2] Nausea and vomiting, unspecified vomiting type (Primary)  [R03.0] Elevated blood pressure reading  [E04.1] Thyroid nodule          ED Disposition Condition    Discharge Stable          ED Prescriptions       Medication Sig Dispense Start Date End Date Auth. Provider    triamcinolone acetonide 0.1% (KENALOG) 0.1 % cream  (Status: Discontinued) Apply topically 2 (two) times daily. 80 g 5/1/2025 5/1/2025 Kulwinder Woodruff MD    ondansetron (ZOFRAN-ODT) 4 MG TbDL Take 1 tablet (4 mg total) by mouth every 6 (six) hours as needed. 12 tablet 5/1/2025 5/4/2025 Mary Kraus MD    meclizine (ANTIVERT) 50 MG tablet Take 0.5 tablets (25 mg total) by mouth 3 (three) times daily as needed for Dizziness or Nausea. 20 tablet 5/1/2025 -- Mary Kraus MD    triamcinolone acetonide 0.1% (KENALOG) 0.1 % cream Apply topically 2 (two) times daily. 80 g 5/1/2025 -- Kulwinder Woodruff MD          Follow-up Information       Follow up With Specialties Details Why Contact Info Additional Information    Russ Mckeon MD Hospitalist Schedule an appointment as soon as possible for a visit   [facility]  [place] [address] [phone]       [facility] [place] Otolaryngology Schedule an appointment as soon as possible for a visit   [address] [place] [address] [phone] [facility] - Main Building, 4th Floor Please park in [facility] and use Clinic elevator               Kulwinder Woodruff MD  Resident  05/01/25 3419     "     [1]   Social History  Tobacco Use    Smoking status: Former     Current packs/day: 1.00     Average packs/day: 1 pack/day for 2.0 years (2.0 ttl pk-yrs)     Types: Cigarettes    Smokeless tobacco: Never   Substance Use Topics    Alcohol use: Never    Drug use: Never        NayanaMary berry MD  05/01/25 1739

## 2025-05-01 NOTE — ED TRIAGE NOTES
Patient report at [facility] around 1 pm, walking to his work office next door, began with dizziness, lightheadedness and nausea. States drank coffee and felt worse. Denies any home medications that he takes daily. Patient reports 2 normal bowel movements within this time. States that he is still feeling nauseas and having episodes of vomiting. Denies any blood in vomit or stool.

## 2025-05-01 NOTE — DISCHARGE INSTRUCTIONS
Your blood pressure was elevated in the emergency department.  You must follow-up with your primary care doctor for reevaluation and/or adjustment of your medication.    Tests today showed: Your EKG did not show any heart problems. Your symptoms are likely caused by vertigo, which is an issue with the inner ear.     Labs Reviewed   COMPREHENSIVE METABOLIC PANEL - Abnormal       Result Value    Sodium 142      Potassium 4.3      Chloride 109      CO2 22 (*)     Glucose 117 (*)     BUN 9      Creatinine 0.9      Calcium 8.8      Protein Total 6.8      Albumin 3.5      Bilirubin Total 1.0      ALP 62      AST 18      ALT 11      Anion Gap 11      eGFR >60     CBC WITH DIFFERENTIAL - Abnormal    WBC 9.36      RBC 4.90      HGB 14.3      HCT 42.0      MCV 86      MCH 29.2      MCHC 34.0      RDW 13.2      Platelet Count 218      MPV 9.2      Nucleated RBC 0      Neut % 81.7 (*)     Lymph % 9.6 (*)     Mono % 7.2      Eos % 0.9      Basophil % 0.3      Imm Grans % 0.3      Neut # 7.65      Lymph # 0.90 (*)     Mono # 0.67      Eos # 0.08      Baso # 0.03      Imm Grans # 0.03     HEPATITIS C ANTIBODY - Normal    Hep C Ab Interp Non-Reactive     HIV 1 / 2 ANTIBODY - Normal    HIV 1/2 Ag/Ab Non-Reactive     TROPONIN I HIGH SENSITIVITY - Normal    Troponin High Sensitive <3     B-TYPE NATRIURETIC PEPTIDE - Normal    BNP 50     LIPASE - Normal    Lipase Level 13     TROPONIN I HIGH SENSITIVITY - Normal    Troponin High Sensitive 4     CBC W/ AUTO DIFFERENTIAL    Narrative:     The following orders were created for panel order CBC auto differential.  Procedure                               Abnormality         Status                     ---------                               -----------         ------                     CBC with Differential[[id number]]       Abnormal            Final result                 Please view results for these tests on the individual orders.   HEP C VIRUS HOLD SPECIMEN   ISTAT CHEM8     Imaging  Results              MRI Brain Without Contrast (Final result)  Result time 05/01/25 03:43:41      Final result by Justin Silva MD (05/01/25 03:43:41)                   Impression:      1. No evidence of acute brain infarct.  2. Chronic microvascular ischemic change.    Electronically signed by resident: Da Anaya  Date:    05/01/2025  Time:    02:48    Electronically signed by: Justin Silva  Date:    05/01/2025  Time:    03:43               Narrative:    EXAMINATION:  MRI BRAIN WITHOUT CONTRAST    CLINICAL HISTORY:  Dizziness, persistent/recurrent, cardiac or vascular cause suspected.    TECHNIQUE:  Multiplanar multisequence MR imaging of the brain was performed without contrast.    COMPARISON:  Same day CTA head neck, CT head 08/29/2021    FINDINGS:  Intracranial compartment:    Ventricles and sulci are normal in size for age without evidence of hydrocephalus. No extra-axial blood or fluid collections.    Scattered T2 FLAIR hyperintensities in the subcortical and periventricular white matter, overall nonspecific but can be seen in setting of chronic microvascular ischemic disease. No mass lesion, acute hemorrhage, edema or acute infarct.  Subcentimeter focus of susceptibility in the left cerebral peduncle, possibly related to an old hemorrhage.    Normal vascular flow voids are preserved.    Skull/extracranial contents (limited evaluation): Bone marrow signal intensity is normal.  Small maxillary mucous retention cysts.                        Preliminary result by Da Anaya MD (05/01/25 02:51:42)                   Impression:      1. No evidence of acute brain infarct.  2. Chronic microvascular ischemic change.    Electronically signed by resident: Da Anaya  Date:    05/01/2025  Time:    02:48                 Narrative:    EXAMINATION:  MRI BRAIN WITHOUT CONTRAST    CLINICAL HISTORY:  Dizziness, persistent/recurrent, cardiac or vascular cause suspected;.    TECHNIQUE:  Multiplanar  multisequence MR imaging of the brain was performed without contrast.    COMPARISON:  Same day CTA head neck, CT head 08/29/2021    FINDINGS:  Intracranial compartment:    Ventricles and sulci are normal in size for age without evidence of hydrocephalus. No extra-axial blood or fluid collections.    Scattered T2 FLAIR hyperintensities in the subcortical and periventricular white matter, overall nonspecific but can be seen in setting of chronic microvascular ischemic disease. No mass lesion, acute hemorrhage, edema or acute infarct.    Normal vascular flow voids are preserved.    Skull/extracranial contents (limited evaluation): Bone marrow signal intensity is normal.  Small maxillary mucous retention cysts.                                       CTA Head and Neck (xpd) (Final result)  Result time 05/01/25 02:25:46      Final result by Justin Silva MD (05/01/25 02:25:46)                   Impression:      1. No acute intracranial abnormality, specifically no intracranial hemorrhage or major vascular territory infarct.  2. No large vessel occlusion or hemodynamically significant large vessel stenosis in the craniocervical cerebrovascular arterial circulation.  3. Chronic microvascular ischemic change.  4. Additional findings as above.    Electronically signed by resident: Da Anaya  Date:    05/01/2025  Time:    01:52    Electronically signed by: Justin Silva  Date:    05/01/2025  Time:    02:25               Narrative:    EXAMINATION:  CTA HEAD AND NECK (XPD)    CLINICAL HISTORY:  Dizziness, persistent/recurrent, cardiac or vascular cause suspected    TECHNIQUE:  Axial CT images obtained throughout the region of the head before and after the administration of intravenous contrast.  CT angiogram was performed through the cervical and intracranial vasculature during the IV bolus administration of 75mL of Omnipaque 350.  Multiplanar MPR and MIP reformats were performed.    COMPARISON:  CT head  08/29/2021    FINDINGS:  The ventricles are normal in size without evidence of hydrocephalus.    Patchy and confluent hypoattenuation throughout the supratentorial white matter, nonspecific but may be seen in the setting of chronic microvascular ischemic change..  No parenchymal mass, hemorrhage, edema or major vascular distribution infarct.    No extra-axial blood or fluid collections.    No displaced calvarial fracture.  Mastoid air cells are clear.  Few small maxillary mucous retention cysts.  Patchy mucosal thickening of the ethmoid air cells.    CTA:    Left-sided, two-vessel aortic arch with common origin of the brachiocephalic trunk and left common carotid artery. The aortic arch demonstrates no significant stenosis at the major branch vessel origins.    The right common carotid artery is normal in caliber. Mild atherosclerosis at the bifurcation with less than 50% stenosis per NASCET criteria.The right internal carotid artery is normal in caliber noting calcification of the cavernous segment without significant stenosis.    The left common carotid artery is normal in caliber. Mild atherosclerosis at the bifurcation with less than 50% stenosis per NASCET criteria.The left internal carotid artery is normal in caliber noting calcification of the cavernous segment without significant stenosis.    Codominant vertebral arteries.  The right vertebral artery is normal in caliber.The left vertebral artery is normal in caliber.    Basilar artery is within normal limits without focal abnormality.    The proximal anterior, middle, and posterior cerebral arteries are within normal limits.  No evidence of significant stenosis, focal occlusion, or intracranial aneurysm.    Subcentimeter hypodense left thyroid nodule which requires no further follow-up per ACR recommendations.  Cervical soft tissues are otherwise within normal limits.  Lung apices are clear.  Mild multilevel cervical spondylosis.  Right C2-C3 facet  ankylosis.  No acute fracture or aggressive osseous lesion.                        Preliminary result by Da Anaya MD (05/01/25 02:06:35)                   Impression:      1. No acute intracranial abnormality, specifically no intracranial hemorrhage or major vascular territory infarct.  2. No large vessel occlusion or hemodynamically significant stenosis about the intracranial or extracranial vasculature.  3. Chronic microvascular ischemic change.  4. Additional findings as above.    Electronically signed by resident: Da Anaya  Date:    05/01/2025  Time:    01:52                 Narrative:    EXAMINATION:  CTA HEAD AND NECK (XPD)    CLINICAL HISTORY:  Dizziness, persistent/recurrent, cardiac or vascular cause suspected;    TECHNIQUE:  Axial CT images obtained throughout the region of the head before and after the administration of intravenous contrast.  CT angiogram was performed through the cervical and intracranial vasculature during the IV bolus administration of 75mL of Omnipaque 350.  Multiplanar MPR and MIP reformats were performed.    COMPARISON:  CT head 08/29/2021    FINDINGS:  The ventricles are normal in size without evidence of hydrocephalus.    Patchy and confluent hypoattenuation throughout the supratentorial white matter, nonspecific but may be seen in the setting of chronic microvascular ischemic change..  No parenchymal mass, hemorrhage, edema or major vascular distribution infarct.    No extra-axial blood or fluid collections.    No displaced calvarial fracture.  Mastoid air cells are clear.  Few small maxillary mucous retention cysts.  Patchy mucosal thickening of the ethmoid air cells.    CTA:    Left-sided, two-vessel aortic arch with common origin of the brachiocephalic trunk and left common carotid artery. The aortic arch demonstrates no significant stenosis at the major branch vessel origins.    The right common carotid artery is normal in caliber. Mild atherosclerosis at the  bifurcation with less than 50% stenosis per NASCET criteria.The right internal carotid artery is normal in caliber noting calcification of the cavernous segment without significant stenosis.    The left common carotid artery is normal in caliber. Mild atherosclerosis at the bifurcation with less than 50% stenosis per NASCET criteria.The left internal carotid artery is normal in caliber noting calcification of the cavernous segment without significant stenosis.    Codominant vertebral arteries.  The right vertebral artery is normal in caliber.The left vertebral artery is normal in caliber.    Basilar artery is within normal limits without focal abnormality.    The proximal anterior, middle, and posterior cerebral arteries are within normal limits.  No evidence of significant stenosis, focal occlusion, or intracranial aneurysm.    Subcentimeter hypodense left thyroid nodule which requires no further follow-up per ACR recommendations.  Cervical soft tissues are otherwise within normal limits.  Lung apices are clear.  Mild multilevel cervical spondylosis.  Right C2-C3 facet ankylosis.  No acute fracture or aggressive osseous lesion.                                       X-Ray Chest AP Portable (Final result)  Result time 05/01/25 01:11:35      Final result by Bianca Lucas MD (05/01/25 01:11:35)                   Impression:      Please see above.      Electronically signed by: Bianca Lucas MD  Date:    05/01/2025  Time:    01:11               Narrative:    EXAMINATION:  XR CHEST AP PORTABLE    CLINICAL HISTORY:  Chest Pain;    TECHNIQUE:  Single frontal view of the chest was performed.    COMPARISON:  None    FINDINGS:  Cardiac monitoring leads overlie the chest.  Mediastinal structures are midline.  There is slight prominence of the cardiomediastinal silhouette a component of which could relate to accentuation by portable AP technique and patient body habitus.  The lungs are symmetrically expanded with diffuse  coarse interstitial attenuation.  No confluent airspace consolidation, substantial volume of pleural fluid or pneumothorax identified.  Visualized osseous structures appear intact.                                      Treatments you had today:   Medications   ondansetron injection 4 mg (4 mg Intravenous Given 4/30/25 2967)   ondansetron injection 4 mg (4 mg Intravenous Given 5/1/25 0153)   meclizine tablet 50 mg (50 mg Oral Given 5/1/25 0026)   iohexoL (OMNIPAQUE 350) injection 75 mL (75 mLs Intravenous Given 5/1/25 0129)   droPERidol injection 0.625 mg (0.625 mg Intravenous Given 5/1/25 0256)   diazePAM tablet 2 mg (2 mg Oral Given 5/1/25 0415)       Home Care Instructions:  - Take the prescribed Meclizine as directed  - Continue taking your home medications as prescribed    Follow-Up Plan:  - Follow-up with: Primary care doctor within 3 - 5 days  - Additional testing and/or evaluation as directed by your primary doctor  - If your symptoms persist, you should follow-up with an ear nose and throat specialist    Return to the Emergency Department for symptoms including but not limited to: worsening symptoms, shortness of breath or chest pain, vomiting with inability to hold down fluids, fevers greater than 100.4°F, weakness of whole body or one one side, dizziness, passing out/fainting/unconsciousness, vision changes, severe or persistent headache, or other concerning symptoms.

## 2025-05-02 LAB
BUN SERPL-MCNC: 10 MG/DL (ref 6–30)
CHLORIDE SERPL-SCNC: 107 MMOL/L (ref 95–110)
CREAT SERPL-MCNC: 0.8 MG/DL (ref 0.5–1.4)
GLUCOSE SERPL-MCNC: 122 MG/DL (ref 70–110)
HCT VFR BLD CALC: 39 %PCV (ref 36–54)
POC IONIZED CALCIUM: 1.03 MMOL/L (ref 1.06–1.42)
POC TCO2 (MEASURED): 25 MMOL/L (ref 23–29)
POTASSIUM BLD-SCNC: 4.1 MMOL/L (ref 3.5–5.1)
SAMPLE: ABNORMAL
SODIUM BLD-SCNC: 140 MMOL/L (ref 136–145)